# Patient Record
Sex: FEMALE | URBAN - METROPOLITAN AREA
[De-identification: names, ages, dates, MRNs, and addresses within clinical notes are randomized per-mention and may not be internally consistent; named-entity substitution may affect disease eponyms.]

---

## 2018-04-03 ENCOUNTER — APPOINTMENT (RX ONLY)
Dept: URBAN - METROPOLITAN AREA OTHER 13 | Facility: OTHER | Age: 68
Setting detail: DERMATOLOGY
End: 2018-04-03

## 2018-04-03 DIAGNOSIS — S0182XA OPEN WOUND OF OTHER AND MULTIPLE SITES OF FACE, COMPLICATED: ICD-10-CM

## 2018-04-03 PROBLEM — S01.00XA UNSPECIFIED OPEN WOUND OF SCALP, INITIAL ENCOUNTER: Status: ACTIVE | Noted: 2018-04-03

## 2018-04-03 PROCEDURE — ? PRESCRIPTION

## 2018-04-03 PROCEDURE — ? DEFER

## 2018-04-03 RX ORDER — MUPIROCIN 20 MG/G
OINTMENT TOPICAL
Qty: 1 | Refills: 0 | Status: ERX | COMMUNITY
Start: 2018-04-03

## 2018-04-03 RX ADMIN — MUPIROCIN: 20 OINTMENT TOPICAL at 20:12

## 2018-04-03 ASSESSMENT — LOCATION ZONE DERM: LOCATION ZONE: SCALP

## 2018-04-03 ASSESSMENT — LOCATION DETAILED DESCRIPTION DERM: LOCATION DETAILED: POSTERIOR MID-PARIETAL SCALP

## 2018-04-03 ASSESSMENT — LOCATION SIMPLE DESCRIPTION DERM: LOCATION SIMPLE: POSTERIOR SCALP

## 2018-04-03 NOTE — HPI: OTHER
Condition:: History of Basal Cell Carcinoma
Please Describe Your Condition:: S/P MOHS performed 2/19/2018 [facility]. C/O area not healing. Very painful.

## 2018-04-04 ENCOUNTER — RX ONLY (OUTPATIENT)
Age: 68
Setting detail: RX ONLY
End: 2018-04-04

## 2018-04-04 RX ORDER — DIAZEPAM 5 MG/1
TABLET ORAL
Qty: 2 | Refills: 0 | Status: CANCELLED
Stop reason: CLARIF

## 2018-04-05 ENCOUNTER — RX ONLY (OUTPATIENT)
Age: 68
Setting detail: RX ONLY
End: 2018-04-05

## 2018-04-05 ENCOUNTER — APPOINTMENT (RX ONLY)
Dept: URBAN - METROPOLITAN AREA OTHER 13 | Facility: OTHER | Age: 68
Setting detail: DERMATOLOGY
End: 2018-04-05

## 2018-04-05 DIAGNOSIS — T81.89 OTHER COMPLICATIONS OF PROCEDURES, NOT ELSEWHERE CLASSIFIED: ICD-10-CM

## 2018-04-05 PROBLEM — T81.89XA OTHER COMPLICATIONS OF PROCEDURES, NOT ELSEWHERE CLASSIFIED, INITIAL ENCOUNTER: Status: ACTIVE | Noted: 2018-04-05

## 2018-04-05 PROCEDURE — 11042 DBRDMT SUBQ TIS 1ST 20SQCM/<: CPT | Mod: 59

## 2018-04-05 PROCEDURE — ? OTHER

## 2018-04-05 PROCEDURE — ? ORDER TESTS

## 2018-04-05 PROCEDURE — ? EXTENSIVE SURGICAL SERVICE OF COMPLICATION

## 2018-04-05 PROCEDURE — 13160 SEC CLSR SURG WND/DEHSN XTN: CPT

## 2018-04-05 PROCEDURE — ? EXCISION DEBRIDEMENT

## 2018-04-05 RX ORDER — PENTOXIFYLLINE 400 MG/1
TABLET, EXTENDED RELEASE ORAL
Qty: 14 | Refills: 0 | Status: ERX | COMMUNITY
Start: 2018-04-05

## 2018-04-05 RX ORDER — DOXYCYCLINE HYCLATE 100 MG/1
CAPSULE, GELATIN COATED ORAL
Qty: 28 | Refills: 0 | Status: ERX | COMMUNITY
Start: 2018-04-05

## 2018-04-05 RX ORDER — MUPIROCIN 20 MG/G
OINTMENT TOPICAL
Qty: 1 | Refills: 0 | Status: ERX

## 2018-04-05 ASSESSMENT — LOCATION DETAILED DESCRIPTION DERM: LOCATION DETAILED: MID-FRONTAL SCALP

## 2018-04-05 ASSESSMENT — LOCATION ZONE DERM: LOCATION ZONE: SCALP

## 2018-04-05 ASSESSMENT — LOCATION SIMPLE DESCRIPTION DERM: LOCATION SIMPLE: ANTERIOR SCALP

## 2018-04-05 NOTE — PROCEDURE: EXTENSIVE SURGICAL SERVICE OF COMPLICATION
Size Of Lesion In Cm (Optional): 8
Anesthesia Type: 1% lidocaine with epinephrine
Consent was obtained and risks were reviewed including but not limited to delayed wound healing, infection, bleeding, hematoma, dehiscence, and pain.
Anesthesia Volume In Cc: 6
Detail Level: Detailed
Surgical Service Of Complication: secondary closure of surgical wound, extensive or complicated

## 2018-04-05 NOTE — PROCEDURE: OTHER
Detail Level: Simple
Other (Free Text): Defect closed with bilateral advancement flap undermined to periosteum using 2-0 prolene and 4-0 prolene.
Note Text (......Xxx Chief Complaint.): This diagnosis correlates with the

## 2018-04-05 NOTE — PROCEDURE: EXCISION DEBRIDEMENT
Anesthesia Type: 1% lidocaine with epinephrine
Necrotic Removal Text: Debridement occurred until all necrotic tissue was removed.
Detail Level: Detailed
Debridement Level: skin and subcutaneous tissue
Size Of Wound In Cm2 (Required): 8
Anesthesia Volume In Cc: 2
Consent was obtained from the patient. The risks, benefits and alternatives to therapy were discussed in detail. Specifically, the risks of infection, scarring, bleeding, prolonged wound healing, nerve injury, and allergy to anesthesia were addressed. Alternatives to debridement, such as aggressive wound care, were also discussed.  Prior to the procedure, the treatment site was clearly identified and confirmed by the patient. All components of Universal Protocol/PAUSE Rule completed.
Render Post-Care Instructions In Note?: no
Post-Care Instructions: I reviewed with the patient in detail post-care instructions. Patient is to keep the area dry for 48 hours, and not to engage in any swimming until the area is healed. Should the patient develop any fevers, chills, bleeding, severe pain patient will contact the office immediately.

## 2018-04-09 ENCOUNTER — APPOINTMENT (RX ONLY)
Dept: URBAN - METROPOLITAN AREA OTHER 13 | Facility: OTHER | Age: 68
Setting detail: DERMATOLOGY
End: 2018-04-09

## 2018-04-09 DIAGNOSIS — T81.89 OTHER COMPLICATIONS OF PROCEDURES, NOT ELSEWHERE CLASSIFIED: ICD-10-CM

## 2018-04-09 PROBLEM — L57.0 ACTINIC KERATOSIS: Status: ACTIVE | Noted: 2018-04-09

## 2018-04-09 PROBLEM — M12.9 ARTHROPATHY, UNSPECIFIED: Status: ACTIVE | Noted: 2018-04-09

## 2018-04-09 PROBLEM — Z85.3 PERSONAL HISTORY OF MALIGNANT NEOPLASM OF BREAST: Status: ACTIVE | Noted: 2018-04-09

## 2018-04-09 PROBLEM — T81.89XA OTHER COMPLICATIONS OF PROCEDURES, NOT ELSEWHERE CLASSIFIED, INITIAL ENCOUNTER: Status: ACTIVE | Noted: 2018-04-09

## 2018-04-09 PROBLEM — Z85.828 PERSONAL HISTORY OF OTHER MALIGNANT NEOPLASM OF SKIN: Status: ACTIVE | Noted: 2018-04-09

## 2018-04-09 PROCEDURE — ? DRESSING CHANGE (GLOBAL PERIOD)

## 2018-04-09 ASSESSMENT — LOCATION ZONE DERM: LOCATION ZONE: SCALP

## 2018-04-09 ASSESSMENT — LOCATION SIMPLE DESCRIPTION DERM: LOCATION SIMPLE: LEFT SCALP

## 2018-04-09 ASSESSMENT — LOCATION DETAILED DESCRIPTION DERM: LOCATION DETAILED: LEFT MEDIAL FRONTAL SCALP

## 2018-04-09 NOTE — PROCEDURE: DRESSING CHANGE (GLOBAL PERIOD)
Add 95228 Cpt? (Important Note: In 2017 The Use Of 18237 Is Being Tracked By Cms To Determine Future Global Period Reimbursement For Global Periods): no
Detail Level: Detailed
Wound Evaluated By: Dr. Paez

## 2018-04-13 ENCOUNTER — RX ONLY (OUTPATIENT)
Age: 68
Setting detail: RX ONLY
End: 2018-04-13

## 2018-04-13 RX ORDER — FLUCONAZOLE 150 MG/1
TABLET ORAL
Qty: 3 | Refills: 0 | Status: ERX | COMMUNITY
Start: 2018-04-13

## 2018-04-19 ENCOUNTER — APPOINTMENT (RX ONLY)
Dept: URBAN - METROPOLITAN AREA OTHER 13 | Facility: OTHER | Age: 68
Setting detail: DERMATOLOGY
End: 2018-04-19

## 2018-04-19 DIAGNOSIS — T81.89 OTHER COMPLICATIONS OF PROCEDURES, NOT ELSEWHERE CLASSIFIED: ICD-10-CM | Status: RESOLVING

## 2018-04-19 PROBLEM — T81.89XA OTHER COMPLICATIONS OF PROCEDURES, NOT ELSEWHERE CLASSIFIED, INITIAL ENCOUNTER: Status: ACTIVE | Noted: 2018-04-19

## 2018-04-19 PROCEDURE — ? SUTURE REMOVAL (GLOBAL PERIOD)

## 2018-04-19 ASSESSMENT — LOCATION ZONE DERM: LOCATION ZONE: SCALP

## 2018-04-19 ASSESSMENT — LOCATION DETAILED DESCRIPTION DERM: LOCATION DETAILED: LEFT MEDIAL FRONTAL SCALP

## 2018-04-19 ASSESSMENT — LOCATION SIMPLE DESCRIPTION DERM: LOCATION SIMPLE: LEFT SCALP

## 2018-04-19 NOTE — PROCEDURE: SUTURE REMOVAL (GLOBAL PERIOD)
Detail Level: Detailed
Add 23627 Cpt? (Important Note: In 2017 The Use Of 05108 Is Being Tracked By Cms To Determine Future Global Period Reimbursement For Global Periods): no

## 2018-04-26 ENCOUNTER — APPOINTMENT (RX ONLY)
Dept: URBAN - METROPOLITAN AREA OTHER 13 | Facility: OTHER | Age: 68
Setting detail: DERMATOLOGY
End: 2018-04-26

## 2018-04-26 DIAGNOSIS — Z48.817 ENCOUNTER FOR SURGICAL AFTERCARE FOLLOWING SURGERY ON THE SKIN AND SUBCUTANEOUS TISSUE: ICD-10-CM

## 2018-04-26 PROCEDURE — ? SUTURE REMOVAL (GLOBAL PERIOD)

## 2018-04-26 ASSESSMENT — LOCATION DETAILED DESCRIPTION DERM: LOCATION DETAILED: MID-FRONTAL SCALP

## 2018-04-26 ASSESSMENT — LOCATION SIMPLE DESCRIPTION DERM: LOCATION SIMPLE: ANTERIOR SCALP

## 2018-04-26 ASSESSMENT — LOCATION ZONE DERM: LOCATION ZONE: SCALP

## 2018-04-26 NOTE — PROCEDURE: SUTURE REMOVAL (GLOBAL PERIOD)
Detail Level: Detailed
Add 32476 Cpt? (Important Note: In 2017 The Use Of 87682 Is Being Tracked By Cms To Determine Future Global Period Reimbursement For Global Periods): no

## 2018-05-29 ENCOUNTER — APPOINTMENT (RX ONLY)
Dept: URBAN - METROPOLITAN AREA OTHER 13 | Facility: OTHER | Age: 68
Setting detail: DERMATOLOGY
End: 2018-05-29

## 2018-05-29 DIAGNOSIS — L57.0 ACTINIC KERATOSIS: ICD-10-CM

## 2018-05-29 PROCEDURE — ? COUNSELING

## 2018-05-29 PROCEDURE — 17003 DESTRUCT PREMALG LES 2-14: CPT | Mod: 79

## 2018-05-29 PROCEDURE — 17000 DESTRUCT PREMALG LESION: CPT | Mod: 79

## 2018-05-29 PROCEDURE — ? LIQUID NITROGEN

## 2018-05-29 ASSESSMENT — LOCATION SIMPLE DESCRIPTION DERM
LOCATION SIMPLE: LEFT FOREARM
LOCATION SIMPLE: LEFT CHEEK
LOCATION SIMPLE: LEFT EYEBROW

## 2018-05-29 ASSESSMENT — LOCATION DETAILED DESCRIPTION DERM
LOCATION DETAILED: LEFT SUPERIOR PREAURICULAR CHEEK
LOCATION DETAILED: LEFT PROXIMAL DORSAL FOREARM
LOCATION DETAILED: LEFT LATERAL EYEBROW

## 2018-05-29 ASSESSMENT — LOCATION ZONE DERM
LOCATION ZONE: FACE
LOCATION ZONE: ARM

## 2018-10-30 ENCOUNTER — APPOINTMENT (RX ONLY)
Dept: URBAN - METROPOLITAN AREA OTHER 13 | Facility: OTHER | Age: 68
Setting detail: DERMATOLOGY
End: 2018-10-30

## 2018-10-30 DIAGNOSIS — Z85.828 PERSONAL HISTORY OF OTHER MALIGNANT NEOPLASM OF SKIN: ICD-10-CM

## 2018-10-30 DIAGNOSIS — L57.0 ACTINIC KERATOSIS: ICD-10-CM

## 2018-10-30 DIAGNOSIS — L82.1 OTHER SEBORRHEIC KERATOSIS: ICD-10-CM

## 2018-10-30 DIAGNOSIS — B35.1 TINEA UNGUIUM: ICD-10-CM

## 2018-10-30 DIAGNOSIS — L72.0 EPIDERMAL CYST: ICD-10-CM

## 2018-10-30 PROCEDURE — ? LIQUID NITROGEN

## 2018-10-30 PROCEDURE — ? TREATMENT REGIMEN

## 2018-10-30 PROCEDURE — 99213 OFFICE O/P EST LOW 20 MIN: CPT | Mod: 25

## 2018-10-30 PROCEDURE — ? COUNSELING

## 2018-10-30 PROCEDURE — ? PRESCRIPTION

## 2018-10-30 PROCEDURE — 17003 DESTRUCT PREMALG LES 2-14: CPT

## 2018-10-30 PROCEDURE — 17000 DESTRUCT PREMALG LESION: CPT

## 2018-10-30 RX ORDER — EFINACONAZOLE 100 MG/ML
SOLUTION TOPICAL
Qty: 1 | Refills: 3 | Status: ERX | COMMUNITY
Start: 2018-10-30

## 2018-10-30 RX ADMIN — EFINACONAZOLE: 100 SOLUTION TOPICAL at 15:06

## 2018-10-30 ASSESSMENT — LOCATION SIMPLE DESCRIPTION DERM
LOCATION SIMPLE: CHEST
LOCATION SIMPLE: RIGHT GREAT TOE
LOCATION SIMPLE: RIGHT UPPER BACK
LOCATION SIMPLE: ANTERIOR SCALP
LOCATION SIMPLE: LEFT LIP
LOCATION SIMPLE: NOSE
LOCATION SIMPLE: RIGHT POSTERIOR UPPER ARM
LOCATION SIMPLE: RIGHT FOREHEAD
LOCATION SIMPLE: LEFT SHOULDER
LOCATION SIMPLE: UPPER BACK
LOCATION SIMPLE: LEFT TEMPLE
LOCATION SIMPLE: RIGHT SHOULDER
LOCATION SIMPLE: LEFT UPPER BACK
LOCATION SIMPLE: RIGHT LIP
LOCATION SIMPLE: POSTERIOR NECK

## 2018-10-30 ASSESSMENT — LOCATION DETAILED DESCRIPTION DERM
LOCATION DETAILED: MIDDLE STERNUM
LOCATION DETAILED: RIGHT INFERIOR MEDIAL UPPER BACK
LOCATION DETAILED: LEFT MID TEMPLE
LOCATION DETAILED: RIGHT GREAT TOENAIL
LOCATION DETAILED: SUPERIOR THORACIC SPINE
LOCATION DETAILED: RIGHT DISTAL LATERAL POSTERIOR UPPER ARM
LOCATION DETAILED: NASAL ROOT
LOCATION DETAILED: MID-FRONTAL SCALP
LOCATION DETAILED: MID POSTERIOR NECK
LOCATION DETAILED: RIGHT POSTERIOR SHOULDER
LOCATION DETAILED: LEFT CENTRAL TEMPLE
LOCATION DETAILED: RIGHT UPPER CUTANEOUS LIP
LOCATION DETAILED: LEFT POSTERIOR SHOULDER
LOCATION DETAILED: LEFT SUPERIOR UPPER BACK
LOCATION DETAILED: LEFT LOWER CUTANEOUS LIP
LOCATION DETAILED: RIGHT INFERIOR FOREHEAD

## 2018-10-30 ASSESSMENT — LOCATION ZONE DERM
LOCATION ZONE: NECK
LOCATION ZONE: SCALP
LOCATION ZONE: TRUNK
LOCATION ZONE: LIP
LOCATION ZONE: NOSE
LOCATION ZONE: ARM
LOCATION ZONE: FACE
LOCATION ZONE: TOENAIL

## 2018-10-30 NOTE — PROCEDURE: LIQUID NITROGEN
Number Of Freeze-Thaw Cycles: 1 freeze-thaw cycle
Post-Care Instructions: I reviewed with the patient in detail post-care instructions. Patient is to wear sunprotection, and avoid picking at any of the treated lesions. Pt may apply Vaseline to crusted or scabbing areas.
Duration Of Freeze Thaw-Cycle (Seconds): 5
Detail Level: Detailed
Consent: The patient's consent was obtained including but not limited to risks of crusting, scabbing, blistering, scarring, darker or lighter pigmentary change, recurrence, incomplete removal and infection.
Render Post-Care Instructions In Note?: no

## 2019-04-30 ENCOUNTER — APPOINTMENT (RX ONLY)
Dept: URBAN - METROPOLITAN AREA OTHER 13 | Facility: OTHER | Age: 69
Setting detail: DERMATOLOGY
End: 2019-04-30

## 2019-04-30 DIAGNOSIS — L81.4 OTHER MELANIN HYPERPIGMENTATION: ICD-10-CM

## 2019-04-30 DIAGNOSIS — Z85.828 PERSONAL HISTORY OF OTHER MALIGNANT NEOPLASM OF SKIN: ICD-10-CM

## 2019-04-30 DIAGNOSIS — L82.1 OTHER SEBORRHEIC KERATOSIS: ICD-10-CM

## 2019-04-30 DIAGNOSIS — L57.0 ACTINIC KERATOSIS: ICD-10-CM

## 2019-04-30 PROCEDURE — 17003 DESTRUCT PREMALG LES 2-14: CPT

## 2019-04-30 PROCEDURE — ? LIQUID NITROGEN

## 2019-04-30 PROCEDURE — 17000 DESTRUCT PREMALG LESION: CPT

## 2019-04-30 PROCEDURE — 99214 OFFICE O/P EST MOD 30 MIN: CPT | Mod: 25

## 2019-04-30 PROCEDURE — ? COUNSELING

## 2019-04-30 ASSESSMENT — LOCATION SIMPLE DESCRIPTION DERM
LOCATION SIMPLE: RIGHT FOOT
LOCATION SIMPLE: LEFT UPPER BACK
LOCATION SIMPLE: LEFT CHEEK
LOCATION SIMPLE: LEFT FOREARM
LOCATION SIMPLE: LEFT FOREHEAD
LOCATION SIMPLE: RIGHT SHOULDER
LOCATION SIMPLE: RIGHT FOREHEAD
LOCATION SIMPLE: CHEST
LOCATION SIMPLE: RIGHT UPPER BACK

## 2019-04-30 ASSESSMENT — LOCATION DETAILED DESCRIPTION DERM
LOCATION DETAILED: LEFT MEDIAL SUPERIOR CHEST
LOCATION DETAILED: LEFT SUPERIOR MEDIAL FOREHEAD
LOCATION DETAILED: LEFT MEDIAL UPPER BACK
LOCATION DETAILED: LEFT SUPERIOR NASAL CHEEK
LOCATION DETAILED: LEFT SUPERIOR MEDIAL MALAR CHEEK
LOCATION DETAILED: LEFT DISTAL DORSAL FOREARM
LOCATION DETAILED: RIGHT ANTERIOR SHOULDER
LOCATION DETAILED: RIGHT SUPERIOR UPPER BACK
LOCATION DETAILED: LEFT SUPERIOR FOREHEAD
LOCATION DETAILED: RIGHT SUPERIOR FOREHEAD
LOCATION DETAILED: RIGHT DORSAL FOOT
LOCATION DETAILED: LEFT LATERAL SUPERIOR CHEST
LOCATION DETAILED: RIGHT MEDIAL SUPERIOR CHEST

## 2019-04-30 ASSESSMENT — LOCATION ZONE DERM
LOCATION ZONE: TRUNK
LOCATION ZONE: FACE
LOCATION ZONE: ARM
LOCATION ZONE: FEET

## 2019-04-30 NOTE — PROCEDURE: LIQUID NITROGEN
Render Note In Bullet Format When Appropriate: No
Consent: The patient's consent was obtained including but not limited to risks of crusting, scabbing, blistering, scarring, darker or lighter pigmentary change, recurrence, incomplete removal and infection.
Detail Level: Detailed
Duration Of Freeze Thaw-Cycle (Seconds): 5
Post-Care Instructions: I reviewed with the patient in detail post-care instructions. Patient is to wear sunprotection, and avoid picking at any of the treated lesions. Pt may apply Vaseline to crusted or scabbing areas.
Number Of Freeze-Thaw Cycles: 1 freeze-thaw cycle

## 2019-07-16 ENCOUNTER — APPOINTMENT (RX ONLY)
Dept: URBAN - METROPOLITAN AREA OTHER 13 | Facility: OTHER | Age: 69
Setting detail: DERMATOLOGY
End: 2019-07-16

## 2019-07-16 DIAGNOSIS — L57.0 ACTINIC KERATOSIS: ICD-10-CM

## 2019-07-16 DIAGNOSIS — L82.1 OTHER SEBORRHEIC KERATOSIS: ICD-10-CM

## 2019-07-16 PROBLEM — D48.5 NEOPLASM OF UNCERTAIN BEHAVIOR OF SKIN: Status: ACTIVE | Noted: 2019-07-16

## 2019-07-16 PROCEDURE — ? COUNSELING

## 2019-07-16 PROCEDURE — 11104 PUNCH BX SKIN SINGLE LESION: CPT

## 2019-07-16 PROCEDURE — 17000 DESTRUCT PREMALG LESION: CPT | Mod: 59

## 2019-07-16 PROCEDURE — 99213 OFFICE O/P EST LOW 20 MIN: CPT | Mod: 25

## 2019-07-16 PROCEDURE — ? LIQUID NITROGEN

## 2019-07-16 PROCEDURE — 17003 DESTRUCT PREMALG LES 2-14: CPT

## 2019-07-16 PROCEDURE — ? BIOPSY BY PUNCH METHOD

## 2019-07-16 ASSESSMENT — LOCATION DETAILED DESCRIPTION DERM
LOCATION DETAILED: LEFT SUPERIOR MEDIAL FOREHEAD
LOCATION DETAILED: LEFT SUPERIOR FOREHEAD
LOCATION DETAILED: RIGHT SUPERIOR TEMPLE
LOCATION DETAILED: LEFT LATERAL FOREHEAD

## 2019-07-16 ASSESSMENT — LOCATION SIMPLE DESCRIPTION DERM
LOCATION SIMPLE: LEFT FOREHEAD
LOCATION SIMPLE: RIGHT TEMPLE

## 2019-07-16 ASSESSMENT — LOCATION ZONE DERM: LOCATION ZONE: FACE

## 2019-07-16 NOTE — PROCEDURE: BIOPSY BY PUNCH METHOD
Was A Bandage Applied: Yes
Suture Removal: 14 days
Post-Care Instructions: I reviewed with the patient in detail post-care instructions. Patient is to keep the biopsy site dry overnight, and then apply bacitracin twice daily until healed. Patient may apply vinegar soaks to remove any crusting.
Size Of Lesion In Cm (Optional): 0
Billing Type: Third-Party Bill
Render In Bullet Format When Appropriate: No
Anesthesia Type: 1% lidocaine with 1:100,000 epinephrine and a 1:6 solution of 8.4% sodium bicarbonate
Consent: Written consent was obtained and risks were reviewed including but not limited to scarring, infection, bleeding, scabbing, incomplete removal, nerve damage and allergy to anesthesia.
Hemostasis: None
Epidermal Sutures: 4-0 Nylon
Home Suture Removal Text: Patient was provided a home suture removal kit and will remove their sutures at home.  If they have any questions or difficulties they will call the office.
Wound Care: Bacitracin
Additional Anesthesia Volume In Cc (Will Not Render If 0): 2
Dressing: bandage
Notification Instructions: Patient will be notified of biopsy results. However, patient instructed to call the office if not contacted within 2 weeks.
Detail Level: Detailed
Biopsy Type: H and E
Lab Facility: [id number]
Punch Size In Mm: 2.5
Lab: [id number]

## 2019-07-29 ENCOUNTER — APPOINTMENT (RX ONLY)
Dept: URBAN - METROPOLITAN AREA OTHER 13 | Facility: OTHER | Age: 69
Setting detail: DERMATOLOGY
End: 2019-07-29

## 2019-07-29 DIAGNOSIS — L90.5 SCAR CONDITIONS AND FIBROSIS OF SKIN: ICD-10-CM

## 2019-07-29 PROCEDURE — ? SUTURE REMOVAL (GLOBAL PERIOD)

## 2019-07-29 ASSESSMENT — LOCATION DETAILED DESCRIPTION DERM: LOCATION DETAILED: MID-FRONTAL SCALP

## 2019-07-29 ASSESSMENT — LOCATION ZONE DERM: LOCATION ZONE: SCALP

## 2019-07-29 ASSESSMENT — LOCATION SIMPLE DESCRIPTION DERM: LOCATION SIMPLE: ANTERIOR SCALP

## 2022-06-01 NOTE — PROCEDURE: SUTURE REMOVAL (GLOBAL PERIOD)
Add 69598 Cpt? (Important Note: In 2017 The Use Of 56131 Is Being Tracked By Cms To Determine Future Global Period Reimbursement For Global Periods): no
Detail Level: Detailed
[Joint Pain] : joint pain
[Anxiety] : anxiety
[Negative] : Heme/Lymph